# Patient Record
Sex: MALE | Race: WHITE | URBAN - METROPOLITAN AREA
[De-identification: names, ages, dates, MRNs, and addresses within clinical notes are randomized per-mention and may not be internally consistent; named-entity substitution may affect disease eponyms.]

---

## 2017-08-23 ENCOUNTER — OFFICE VISIT (OUTPATIENT)
Dept: FAMILY MEDICINE CLINIC | Age: 52
End: 2017-08-23

## 2017-08-23 VITALS
HEART RATE: 65 BPM | HEIGHT: 74 IN | DIASTOLIC BLOOD PRESSURE: 78 MMHG | WEIGHT: 223 LBS | BODY MASS INDEX: 28.62 KG/M2 | OXYGEN SATURATION: 98 % | SYSTOLIC BLOOD PRESSURE: 126 MMHG

## 2017-08-23 DIAGNOSIS — R79.89 LOW TESTOSTERONE LEVEL IN MALE: ICD-10-CM

## 2017-08-23 DIAGNOSIS — M50.322 OTHER CERVICAL DISC DEGENERATION AT C5-C6 LEVEL: ICD-10-CM

## 2017-08-23 DIAGNOSIS — R06.83 SNORING: Primary | ICD-10-CM

## 2017-08-23 DIAGNOSIS — J45.20 MILD INTERMITTENT ASTHMA WITHOUT COMPLICATION: ICD-10-CM

## 2017-08-23 LAB
HCT VFR BLD CALC: 41.9 % (ref 40.5–52.5)
HEMOGLOBIN: 13.9 G/DL (ref 13.5–17.5)
MCH RBC QN AUTO: 31 PG (ref 26–34)
MCHC RBC AUTO-ENTMCNC: 33.1 G/DL (ref 31–36)
MCV RBC AUTO: 93.7 FL (ref 80–100)
PDW BLD-RTO: 13.2 % (ref 12.4–15.4)
PLATELET # BLD: 253 K/UL (ref 135–450)
PMV BLD AUTO: 8 FL (ref 5–10.5)
PROSTATE SPECIFIC ANTIGEN: 0.87 NG/ML (ref 0–4)
RBC # BLD: 4.47 M/UL (ref 4.2–5.9)
WBC # BLD: 6.8 K/UL (ref 4–11)

## 2017-08-23 PROCEDURE — 90715 TDAP VACCINE 7 YRS/> IM: CPT | Performed by: FAMILY MEDICINE

## 2017-08-23 PROCEDURE — 90471 IMMUNIZATION ADMIN: CPT | Performed by: FAMILY MEDICINE

## 2017-08-23 PROCEDURE — 99203 OFFICE O/P NEW LOW 30 MIN: CPT | Performed by: FAMILY MEDICINE

## 2017-08-23 RX ORDER — ALBUTEROL SULFATE 90 UG/1
2 AEROSOL, METERED RESPIRATORY (INHALATION) EVERY 6 HOURS PRN
COMMUNITY
End: 2017-08-23 | Stop reason: SDUPTHER

## 2017-08-23 RX ORDER — ALBUTEROL SULFATE 90 UG/1
2 AEROSOL, METERED RESPIRATORY (INHALATION) EVERY 6 HOURS PRN
Qty: 1 INHALER | Refills: 2 | Status: SHIPPED | OUTPATIENT
Start: 2017-08-23 | End: 2018-01-25 | Stop reason: SDUPTHER

## 2017-08-23 RX ORDER — TESTOSTERONE 30 MG/1.5ML
60 SOLUTION TOPICAL DAILY
Qty: 1 BOTTLE | Refills: 2 | Status: SHIPPED | OUTPATIENT
Start: 2017-08-23 | End: 2018-01-02 | Stop reason: SDUPTHER

## 2017-08-23 ASSESSMENT — ENCOUNTER SYMPTOMS: SHORTNESS OF BREATH: 0

## 2017-08-25 LAB
SEX HORMONE BINDING GLOBULIN: 23 NMOL/L (ref 11–80)
TESTOSTERONE FREE-NONMALE: 52 PG/ML (ref 47–244)
TESTOSTERONE TOTAL: 223 NG/DL (ref 220–1000)

## 2017-09-08 ENCOUNTER — TELEPHONE (OUTPATIENT)
Dept: FAMILY MEDICINE CLINIC | Age: 52
End: 2017-09-08

## 2018-01-09 ENCOUNTER — OFFICE VISIT (OUTPATIENT)
Dept: FAMILY MEDICINE CLINIC | Age: 53
End: 2018-01-09

## 2018-01-09 VITALS
SYSTOLIC BLOOD PRESSURE: 132 MMHG | OXYGEN SATURATION: 97 % | RESPIRATION RATE: 12 BRPM | HEART RATE: 64 BPM | BODY MASS INDEX: 29.82 KG/M2 | DIASTOLIC BLOOD PRESSURE: 78 MMHG | HEIGHT: 74 IN | WEIGHT: 232.4 LBS | TEMPERATURE: 98 F

## 2018-01-09 DIAGNOSIS — R79.89 LOW TESTOSTERONE IN MALE: ICD-10-CM

## 2018-01-09 DIAGNOSIS — R06.83 SNORING: Primary | ICD-10-CM

## 2018-01-09 PROCEDURE — G8417 CALC BMI ABV UP PARAM F/U: HCPCS | Performed by: FAMILY MEDICINE

## 2018-01-09 PROCEDURE — 99214 OFFICE O/P EST MOD 30 MIN: CPT | Performed by: FAMILY MEDICINE

## 2018-01-09 PROCEDURE — G8427 DOCREV CUR MEDS BY ELIG CLIN: HCPCS | Performed by: FAMILY MEDICINE

## 2018-01-09 PROCEDURE — 3017F COLORECTAL CA SCREEN DOC REV: CPT | Performed by: FAMILY MEDICINE

## 2018-01-09 PROCEDURE — G8484 FLU IMMUNIZE NO ADMIN: HCPCS | Performed by: FAMILY MEDICINE

## 2018-01-09 PROCEDURE — 1036F TOBACCO NON-USER: CPT | Performed by: FAMILY MEDICINE

## 2018-01-09 RX ORDER — TESTOSTERONE CYPIONATE 200 MG/ML
200 INJECTION INTRAMUSCULAR
Qty: 2 VIAL | Refills: 5 | Status: SHIPPED | OUTPATIENT
Start: 2018-01-09 | End: 2018-01-09 | Stop reason: SDUPTHER

## 2018-01-09 RX ORDER — TESTOSTERONE CYPIONATE 200 MG/ML
200 INJECTION INTRAMUSCULAR
Qty: 2 VIAL | Refills: 5 | Status: SHIPPED | OUTPATIENT
Start: 2018-01-09 | End: 2018-08-10 | Stop reason: SDUPTHER

## 2018-01-09 NOTE — PROGRESS NOTES
Subjective:      Patient ID: Cain Fields is a 47 y.o. male. HPI   Pt is a of 47 y.o. male comes in today with   Chief Complaint   Patient presents with    Medication Refill     Here for med refill. Has been doing topical testosterone but not helping. Still low energy, low libido    Does snore    Review of Systems    Objective:   Physical Exam    Assessment:      1. Snoring  Amb External Referral To Sleep Studies   2. Low testosterone in male  CBC    PSA, Prostatic Specific Antigen    Comprehensive Metabolic Panel    Testosterone Free and Total Male          Plan:      1. Referred for evaluation for lolly  2. Suspect lack of absorption. Will try IM  Reviewed injection technique with the patient    recheck bloodwork 6-8 weeks after startinch  Out on technique given.   Can return for 1st injection in office if desired  20 min spent, > 50% of time counseling and coordinating care

## 2018-01-25 NOTE — TELEPHONE ENCOUNTER
Pt needs printed script for inhalers he would like a 6 month supply because he travels  [employer] would also like a maintenance inhaler because his symptoms have been bad this year  Pt was seen 1/18 but forgot to mention

## 2018-01-26 RX ORDER — ALBUTEROL SULFATE 90 UG/1
2 AEROSOL, METERED RESPIRATORY (INHALATION) EVERY 6 HOURS PRN
Qty: 3 INHALER | Refills: 2 | Status: SHIPPED | OUTPATIENT
Start: 2018-01-26 | End: 2018-02-19 | Stop reason: SDUPTHER

## 2018-02-15 LAB
ALBUMIN SERPL-MCNC: 4.6 G/DL
ALP BLD-CCNC: 61 U/L
ALT SERPL-CCNC: 32 U/L
ANION GAP SERPL CALCULATED.3IONS-SCNC: NORMAL MMOL/L
AST SERPL-CCNC: 25 U/L
BILIRUB SERPL-MCNC: 0.6 MG/DL (ref 0.1–1.4)
BUN BLDV-MCNC: 11 MG/DL
CALCIUM SERPL-MCNC: 9 MG/DL
CHLORIDE BLD-SCNC: 99 MMOL/L
CO2: 24 MMOL/L
CREAT SERPL-MCNC: 1 MG/DL
GFR CALCULATED: NORMAL
GLUCOSE BLD-MCNC: 94 MG/DL
POTASSIUM SERPL-SCNC: 4.4 MMOL/L
SODIUM BLD-SCNC: 141 MMOL/L
TOTAL PROTEIN: 7.1

## 2018-02-19 ENCOUNTER — TELEPHONE (OUTPATIENT)
Dept: FAMILY MEDICINE CLINIC | Age: 53
End: 2018-02-19

## 2018-02-19 RX ORDER — ALBUTEROL SULFATE 90 UG/1
2 AEROSOL, METERED RESPIRATORY (INHALATION) EVERY 6 HOURS PRN
Qty: 3 INHALER | Refills: 2 | Status: SHIPPED | OUTPATIENT
Start: 2018-02-19 | End: 2018-12-13 | Stop reason: SDUPTHER

## 2018-02-19 NOTE — TELEPHONE ENCOUNTER
Pt called back requesting to have a paper Rx written for his inhaler. Pt would like to purchase the medication from places where he can get it cheaper. Pt would like to stop by the office tomorrow to pick it up. Pt said he asked for it 2 weeks ago but never receive it.

## 2018-02-22 ENCOUNTER — TELEPHONE (OUTPATIENT)
Dept: FAMILY MEDICINE CLINIC | Age: 53
End: 2018-02-22

## 2018-03-02 NOTE — TELEPHONE ENCOUNTER
Spoke with patient about the aveo inhaler. He said that he has been on this inhaler for a very long time, that he is a 47year old man, he doesn't need to be seen for an appointment to be checked for asthma when he has already been diagnosed with it when he was younger. Talked to Dr. Edelmira Agarwal, he said that as long as the patient tells us exactly which inhaler he is on. Left message for patient to call back with his inhaler information in order for us to fill this prescription.

## 2018-03-07 ENCOUNTER — TELEPHONE (OUTPATIENT)
Dept: FAMILY MEDICINE CLINIC | Age: 53
End: 2018-03-07

## 2018-03-07 RX ORDER — FLUTICASONE FUROATE AND VILANTEROL 100; 25 UG/1; UG/1
1 POWDER RESPIRATORY (INHALATION) DAILY
Qty: 1 EACH | Refills: 5 | Status: SHIPPED | OUTPATIENT
Start: 2018-03-07 | End: 2018-03-08 | Stop reason: SDUPTHER

## 2018-03-07 NOTE — TELEPHONE ENCOUNTER
Patient stopped by the office with his inhaler, he takes Breo Ellipta 100/25 MCG, 1 puff daily.  Pending to [facility]

## 2018-03-08 RX ORDER — FLUTICASONE FUROATE AND VILANTEROL 100; 25 UG/1; UG/1
1 POWDER RESPIRATORY (INHALATION) DAILY
Qty: 1 EACH | Refills: 5 | Status: SHIPPED | OUTPATIENT
Start: 2018-03-08 | End: 2018-09-07 | Stop reason: SDUPTHER

## 2018-08-06 DIAGNOSIS — R79.89 LOW TESTOSTERONE: Primary | ICD-10-CM

## 2018-08-06 NOTE — TELEPHONE ENCOUNTER
Medication:   Requested Prescriptions     Pending Prescriptions Disp Refills    testosterone cypionate (DEPOTESTOTERONE CYPIONATE) 200 MG/ML injection [Pharmacy Med Name: TESTOST CYP 200MG/ML INJ]  5     Sig: INJECT ONE ML (CC) INTRAMUSCULARLY EVERY 14 DAYS        Last Filled:  1/9/2018 2 vials w/ 5 refills     Patient Phone Number: [phone] (home)      Last appt: 01.09.2018  Return in about 6 months (around 7/9/2018). Next appt: Visit date not found    Last OARRS: No flowsheet data found.     Preferred Pharmacy:   [address] [place] [address]  [address] [address]  Phone: [phone] Fax: [phone]

## 2018-08-07 RX ORDER — TESTOSTERONE CYPIONATE 200 MG/ML
INJECTION INTRAMUSCULAR
Qty: 2 VIAL | Refills: 0 | Status: SHIPPED | OUTPATIENT
Start: 2018-08-07 | End: 2018-08-10

## 2018-08-10 ENCOUNTER — OFFICE VISIT (OUTPATIENT)
Dept: FAMILY MEDICINE CLINIC | Age: 53
End: 2018-08-10

## 2018-08-10 VITALS
SYSTOLIC BLOOD PRESSURE: 110 MMHG | HEART RATE: 70 BPM | WEIGHT: 225 LBS | BODY MASS INDEX: 31.5 KG/M2 | OXYGEN SATURATION: 96 % | HEIGHT: 71 IN | DIASTOLIC BLOOD PRESSURE: 72 MMHG

## 2018-08-10 DIAGNOSIS — R79.89 LOW TESTOSTERONE IN MALE: ICD-10-CM

## 2018-08-10 DIAGNOSIS — Z00.00 HEALTHCARE MAINTENANCE: ICD-10-CM

## 2018-08-10 DIAGNOSIS — B00.9 HERPES SIMPLEX: ICD-10-CM

## 2018-08-10 DIAGNOSIS — R79.89 LOW TESTOSTERONE IN MALE: Primary | ICD-10-CM

## 2018-08-10 LAB
A/G RATIO: 1.9 (ref 1.1–2.2)
ALBUMIN SERPL-MCNC: 4.7 G/DL (ref 3.4–5)
ALP BLD-CCNC: 62 U/L (ref 40–129)
ALT SERPL-CCNC: 25 U/L (ref 10–40)
ANION GAP SERPL CALCULATED.3IONS-SCNC: 13 MMOL/L (ref 3–16)
AST SERPL-CCNC: 19 U/L (ref 15–37)
BASOPHILS ABSOLUTE: 0 K/UL (ref 0–0.2)
BASOPHILS RELATIVE PERCENT: 0.4 %
BILIRUB SERPL-MCNC: <0.2 MG/DL (ref 0–1)
BUN BLDV-MCNC: 11 MG/DL (ref 7–20)
CALCIUM SERPL-MCNC: 9 MG/DL (ref 8.3–10.6)
CHLORIDE BLD-SCNC: 104 MMOL/L (ref 99–110)
CHOLESTEROL, TOTAL: 163 MG/DL (ref 0–199)
CO2: 25 MMOL/L (ref 21–32)
CREAT SERPL-MCNC: 0.9 MG/DL (ref 0.9–1.3)
EOSINOPHILS ABSOLUTE: 0.2 K/UL (ref 0–0.6)
EOSINOPHILS RELATIVE PERCENT: 2.4 %
GFR AFRICAN AMERICAN: >60
GFR NON-AFRICAN AMERICAN: >60
GLOBULIN: 2.5 G/DL
GLUCOSE BLD-MCNC: 120 MG/DL (ref 70–99)
HCT VFR BLD CALC: 45.3 % (ref 40.5–52.5)
HDLC SERPL-MCNC: 40 MG/DL (ref 40–60)
HEMOGLOBIN: 15.4 G/DL (ref 13.5–17.5)
LDL CHOLESTEROL CALCULATED: 69 MG/DL
LYMPHOCYTES ABSOLUTE: 2 K/UL (ref 1–5.1)
LYMPHOCYTES RELATIVE PERCENT: 25.2 %
MCH RBC QN AUTO: 31.5 PG (ref 26–34)
MCHC RBC AUTO-ENTMCNC: 34 G/DL (ref 31–36)
MCV RBC AUTO: 92.6 FL (ref 80–100)
MONOCYTES ABSOLUTE: 0.7 K/UL (ref 0–1.3)
MONOCYTES RELATIVE PERCENT: 8.5 %
NEUTROPHILS ABSOLUTE: 5 K/UL (ref 1.7–7.7)
NEUTROPHILS RELATIVE PERCENT: 63.5 %
PDW BLD-RTO: 14.2 % (ref 12.4–15.4)
PLATELET # BLD: 283 K/UL (ref 135–450)
PMV BLD AUTO: 8.1 FL (ref 5–10.5)
POTASSIUM SERPL-SCNC: 4.6 MMOL/L (ref 3.5–5.1)
PROSTATE SPECIFIC ANTIGEN: 0.82 NG/ML (ref 0–4)
RBC # BLD: 4.89 M/UL (ref 4.2–5.9)
SODIUM BLD-SCNC: 142 MMOL/L (ref 136–145)
TOTAL PROTEIN: 7.2 G/DL (ref 6.4–8.2)
TRIGL SERPL-MCNC: 269 MG/DL (ref 0–150)
VLDLC SERPL CALC-MCNC: 54 MG/DL
WBC # BLD: 7.8 K/UL (ref 4–11)

## 2018-08-10 PROCEDURE — G8417 CALC BMI ABV UP PARAM F/U: HCPCS | Performed by: NURSE PRACTITIONER

## 2018-08-10 PROCEDURE — 3017F COLORECTAL CA SCREEN DOC REV: CPT | Performed by: NURSE PRACTITIONER

## 2018-08-10 PROCEDURE — 99213 OFFICE O/P EST LOW 20 MIN: CPT | Performed by: NURSE PRACTITIONER

## 2018-08-10 PROCEDURE — 1036F TOBACCO NON-USER: CPT | Performed by: NURSE PRACTITIONER

## 2018-08-10 PROCEDURE — G8427 DOCREV CUR MEDS BY ELIG CLIN: HCPCS | Performed by: NURSE PRACTITIONER

## 2018-08-10 RX ORDER — TESTOSTERONE CYPIONATE 200 MG/ML
200 INJECTION INTRAMUSCULAR
Qty: 2 VIAL | Refills: 5 | Status: SHIPPED | OUTPATIENT
Start: 2018-08-10 | End: 2019-04-15 | Stop reason: SDUPTHER

## 2018-08-10 RX ORDER — VALACYCLOVIR HYDROCHLORIDE 1 G/1
1000 TABLET, FILM COATED ORAL 2 TIMES DAILY PRN
Qty: 30 TABLET | Refills: 0 | Status: SHIPPED | OUTPATIENT
Start: 2018-08-10 | End: 2018-10-16 | Stop reason: SDUPTHER

## 2018-08-10 ASSESSMENT — ENCOUNTER SYMPTOMS
CONSTIPATION: 0
SINUS PRESSURE: 0
BLOOD IN STOOL: 0
CHEST TIGHTNESS: 0
ABDOMINAL PAIN: 0
SHORTNESS OF BREATH: 0
EYE REDNESS: 0
VOMITING: 0
RHINORRHEA: 0
BACK PAIN: 0
DIARRHEA: 0
APNEA: 0
ABDOMINAL DISTENTION: 0
COUGH: 0
EYE PAIN: 0
WHEEZING: 0
NAUSEA: 0

## 2018-08-10 NOTE — PROGRESS NOTES
Genitourinary: Negative for difficulty urinating, dysuria, enuresis, frequency, hematuria and urgency. Musculoskeletal: Negative for back pain, gait problem, joint swelling and neck pain. Skin: Negative for rash and wound. Allergic/Immunologic: Negative for environmental allergies, food allergies and immunocompromised state. Neurological: Negative for dizziness, syncope, light-headedness, numbness and headaches. Hematological: Negative for adenopathy. Does not bruise/bleed easily. Psychiatric/Behavioral: Negative for agitation, behavioral problems and confusion. The patient is not nervous/anxious. Physical Exam   Constitutional: He is oriented to person, place, and time. He appears well-developed and well-nourished. No distress. HENT:   Head: Normocephalic and atraumatic. Eyes: Conjunctivae are normal. No scleral icterus. Neck: Normal range of motion. Neck supple. No tracheal deviation present. Cardiovascular: Normal rate, regular rhythm, normal heart sounds and intact distal pulses. Exam reveals no gallop and no friction rub. No murmur heard. Pulmonary/Chest: Effort normal and breath sounds normal. No respiratory distress. He has no wheezes. He has no rales. He exhibits no tenderness. Abdominal: Soft. Bowel sounds are normal. He exhibits no distension. There is no tenderness. Musculoskeletal: Normal range of motion. He exhibits no edema. Neurological: He is alert and oriented to person, place, and time. Coordination normal.   Skin: Skin is warm and dry. No rash noted. He is not diaphoretic. No erythema. No pallor. Psychiatric: He has a normal mood and affect. Thought content normal.     Assessment/Plan:  1. Low testosterone in male  Stable, controlled on current regimen. Due for labs. - Comprehensive Metabolic Panel; Future  - Testosterone, free, total; Future  - Testosterone; Future  - CBC Auto Differential; Future  - Lipid Panel; Future  - Psa screening;  Future  -

## 2018-08-14 LAB
SEX HORMONE BINDING GLOBULIN: 27 NMOL/L (ref 11–80)
TESTOSTERONE FREE-NONMALE: 21.4 PG/ML (ref 47–244)
TESTOSTERONE TOTAL: 104 NG/DL (ref 220–1000)

## 2018-09-07 RX ORDER — FLUTICASONE FUROATE AND VILANTEROL TRIFENATATE 100; 25 UG/1; UG/1
POWDER RESPIRATORY (INHALATION)
Qty: 60 EACH | Refills: 5 | Status: SHIPPED | OUTPATIENT
Start: 2018-09-07 | End: 2018-12-13 | Stop reason: SDUPTHER

## 2018-09-07 NOTE — TELEPHONE ENCOUNTER
Medication:   Requested Prescriptions     Pending Prescriptions Disp Refills    BREO ELLIPTA 100-25 MCG/INH AEPB inhaler [Pharmacy Med Name: Марина Sin 100-25  INH]  5     Sig: INHALE 1 PUFF BY MOUTH ONCE DAILY       Last Filled:      Patient Phone Number: [phone] (home)     Last appt: 8/10/2018   Next appt: Visit date not found    Last Labs DM: No results found for: LABA1C  Last Lipid:   Lab Results   Component Value Date    CHOL 163 08/10/2018    TRIG 269 08/10/2018    HDL 40 08/10/2018    LDLCALC 69 08/10/2018     Last PSA:   Lab Results   Component Value Date    PSA 0.82 08/10/2018     Last Thyroid: No results found for: TSH, FT3, I2HCOZM, T4FREE, T8THGDI    Last OARRS:   RX Monitoring 8/10/2018   Attestation The Prescription Monitoring Report for this patient was reviewed today. Documentation Possible medication side effects, risk of tolerance/dependence & alternative treatments discussed. ;No signs of potential drug abuse or diversion identified. Preferred Pharmacy:   [facility] [address] [place] [address]  [address] [facility] [address]  [address]  Phone: [phone] Fax: [phone]    [address] [facility] [phone] [facility] [phone]  [address]   [place] [address]  Phone: [phone] Fax: [phone]

## 2018-10-16 DIAGNOSIS — B00.9 HERPES SIMPLEX: ICD-10-CM

## 2018-10-17 RX ORDER — VALACYCLOVIR HYDROCHLORIDE 1 G/1
1000 TABLET, FILM COATED ORAL 2 TIMES DAILY PRN
Qty: 30 TABLET | Refills: 0 | Status: SHIPPED | OUTPATIENT
Start: 2018-10-17 | End: 2019-04-22 | Stop reason: SDUPTHER

## 2018-11-14 ENCOUNTER — OFFICE VISIT (OUTPATIENT)
Dept: FAMILY MEDICINE CLINIC | Age: 53
End: 2018-11-14
Payer: COMMERCIAL

## 2018-11-14 VITALS
BODY MASS INDEX: 31.5 KG/M2 | HEIGHT: 71 IN | WEIGHT: 225 LBS | SYSTOLIC BLOOD PRESSURE: 128 MMHG | OXYGEN SATURATION: 98 % | HEART RATE: 59 BPM | DIASTOLIC BLOOD PRESSURE: 80 MMHG

## 2018-11-14 DIAGNOSIS — G89.29 CHRONIC LEFT-SIDED LOW BACK PAIN WITHOUT SCIATICA: Primary | ICD-10-CM

## 2018-11-14 DIAGNOSIS — M54.50 CHRONIC LEFT-SIDED LOW BACK PAIN WITHOUT SCIATICA: Primary | ICD-10-CM

## 2018-11-14 PROCEDURE — 99213 OFFICE O/P EST LOW 20 MIN: CPT | Performed by: FAMILY MEDICINE

## 2018-11-14 PROCEDURE — G8417 CALC BMI ABV UP PARAM F/U: HCPCS | Performed by: FAMILY MEDICINE

## 2018-11-14 PROCEDURE — G8427 DOCREV CUR MEDS BY ELIG CLIN: HCPCS | Performed by: FAMILY MEDICINE

## 2018-11-14 PROCEDURE — 3017F COLORECTAL CA SCREEN DOC REV: CPT | Performed by: FAMILY MEDICINE

## 2018-11-14 PROCEDURE — G8484 FLU IMMUNIZE NO ADMIN: HCPCS | Performed by: FAMILY MEDICINE

## 2018-11-14 PROCEDURE — 1036F TOBACCO NON-USER: CPT | Performed by: FAMILY MEDICINE

## 2018-11-14 ASSESSMENT — PATIENT HEALTH QUESTIONNAIRE - PHQ9
2. FEELING DOWN, DEPRESSED OR HOPELESS: 0
SUM OF ALL RESPONSES TO PHQ QUESTIONS 1-9: 0
SUM OF ALL RESPONSES TO PHQ9 QUESTIONS 1 & 2: 0
SUM OF ALL RESPONSES TO PHQ QUESTIONS 1-9: 0
1. LITTLE INTEREST OR PLEASURE IN DOING THINGS: 0

## 2018-11-29 ENCOUNTER — OFFICE VISIT (OUTPATIENT)
Dept: ORTHOPEDIC SURGERY | Age: 53
End: 2018-11-29
Payer: COMMERCIAL

## 2018-11-29 VITALS — HEIGHT: 71 IN | WEIGHT: 225.09 LBS | BODY MASS INDEX: 31.51 KG/M2

## 2018-11-29 DIAGNOSIS — M43.16 SPONDYLOLISTHESIS OF LUMBAR REGION: Primary | ICD-10-CM

## 2018-11-29 DIAGNOSIS — M54.16 LUMBAR RADICULOPATHY: ICD-10-CM

## 2018-11-29 DIAGNOSIS — M51.36 DDD (DEGENERATIVE DISC DISEASE), LUMBAR: ICD-10-CM

## 2018-11-29 PROCEDURE — G8417 CALC BMI ABV UP PARAM F/U: HCPCS | Performed by: PHYSICAL MEDICINE & REHABILITATION

## 2018-11-29 PROCEDURE — G8427 DOCREV CUR MEDS BY ELIG CLIN: HCPCS | Performed by: PHYSICAL MEDICINE & REHABILITATION

## 2018-11-29 PROCEDURE — 99243 OFF/OP CNSLTJ NEW/EST LOW 30: CPT | Performed by: PHYSICAL MEDICINE & REHABILITATION

## 2018-11-29 PROCEDURE — G8484 FLU IMMUNIZE NO ADMIN: HCPCS | Performed by: PHYSICAL MEDICINE & REHABILITATION

## 2018-11-29 PROCEDURE — 3017F COLORECTAL CA SCREEN DOC REV: CPT | Performed by: PHYSICAL MEDICINE & REHABILITATION

## 2018-11-30 ENCOUNTER — TELEPHONE (OUTPATIENT)
Dept: ORTHOPEDIC SURGERY | Age: 53
End: 2018-11-30

## 2018-11-30 NOTE — TELEPHONE ENCOUNTER
Spoke to patient regarding MRI [facility] approval.  Authorization is valid until 5/28/19. Patient will need to call DAY[facility] Esteban to schedule MRI. He is scheduled for follow up appointment with Dr. Jennie Oconnor on 12/20/18. He noted understanding.

## 2018-12-11 ENCOUNTER — OFFICE VISIT (OUTPATIENT)
Dept: ORTHOPEDIC SURGERY | Age: 53
End: 2018-12-11
Payer: COMMERCIAL

## 2018-12-11 VITALS
HEIGHT: 73 IN | SYSTOLIC BLOOD PRESSURE: 134 MMHG | BODY MASS INDEX: 29.16 KG/M2 | WEIGHT: 220 LBS | DIASTOLIC BLOOD PRESSURE: 75 MMHG

## 2018-12-11 DIAGNOSIS — M51.26 LUMBAR DISC HERNIATION: Primary | ICD-10-CM

## 2018-12-11 DIAGNOSIS — M54.16 LUMBAR RADICULOPATHY: ICD-10-CM

## 2018-12-11 PROCEDURE — 1036F TOBACCO NON-USER: CPT | Performed by: PHYSICAL MEDICINE & REHABILITATION

## 2018-12-11 PROCEDURE — 3017F COLORECTAL CA SCREEN DOC REV: CPT | Performed by: PHYSICAL MEDICINE & REHABILITATION

## 2018-12-11 PROCEDURE — G8427 DOCREV CUR MEDS BY ELIG CLIN: HCPCS | Performed by: PHYSICAL MEDICINE & REHABILITATION

## 2018-12-11 PROCEDURE — G8484 FLU IMMUNIZE NO ADMIN: HCPCS | Performed by: PHYSICAL MEDICINE & REHABILITATION

## 2018-12-11 PROCEDURE — 99213 OFFICE O/P EST LOW 20 MIN: CPT | Performed by: PHYSICAL MEDICINE & REHABILITATION

## 2018-12-11 PROCEDURE — G8417 CALC BMI ABV UP PARAM F/U: HCPCS | Performed by: PHYSICAL MEDICINE & REHABILITATION

## 2018-12-11 NOTE — PROGRESS NOTES
8.3 - 10.6 mg/dL    Total Protein 7.2 6.4 - 8.2 g/dL    Alb 4.7 3.4 - 5.0 g/dL    Albumin/Globulin Ratio 1.9 1.1 - 2.2    Total Bilirubin <0.2 0.0 - 1.0 mg/dL    Alkaline Phosphatase 62 40 - 129 U/L    ALT 25 10 - 40 U/L    AST 19 15 - 37 U/L    Globulin 2.5 g/dL   Testosterone, free, total   Result Value Ref Range    Testosterone 104 (L) 220 - 1,000 ng/dL    Sex Hormone Binding 27 11 - 80 nmol/L    Testosterone, Free 21.4 (L) 47 - 244 pg/mL   CBC Auto Differential   Result Value Ref Range    WBC 7.8 4.0 - 11.0 K/uL    RBC 4.89 4.20 - 5.90 M/uL    Hemoglobin 15.4 13.5 - 17.5 g/dL    Hematocrit 45.3 40.5 - 52.5 %    MCV 92.6 80.0 - 100.0 fL    MCH 31.5 26.0 - 34.0 pg    MCHC 34.0 31.0 - 36.0 g/dL    RDW 14.2 12.4 - 15.4 %    Platelets 640 081 - 695 K/uL    MPV 8.1 5.0 - 10.5 fL    Neutrophils % 63.5 %    Lymphocytes % 25.2 %    Monocytes % 8.5 %    Eosinophils % 2.4 %    Basophils % 0.4 %    Neutrophils # 5.0 1.7 - 7.7 K/uL    Lymphocytes # 2.0 1.0 - 5.1 K/uL    Monocytes # 0.7 0.0 - 1.3 K/uL    Eosinophils # 0.2 0.0 - 0.6 K/uL    Basophils # 0.0 0.0 - 0.2 K/uL   Lipid Panel   Result Value Ref Range    Cholesterol, Total 163 0 - 199 mg/dL    Triglycerides 269 (H) 0 - 150 mg/dL    HDL 40 40 - 60 mg/dL    LDL Calculated 69 <100 mg/dL    VLDL Cholesterol Calculated 54 Not Established mg/dL   Psa screening   Result Value Ref Range    PSA 0.82 0.00 - 4.00 ng/mL     Impression:       1. Lumbar disc herniation    2. Lumbar radiculopathy        Plan:  Clinical Course: Above diagnoses are improving     I discussed the diagnosis and the treatment options with Kajal Gomez today. In Summary:  The various treatment options were outlined and discussed with Kajal Reasons including:  Conservative care options: physical therapy, ice, medications, bracing, and activity modification. The indications for therapeutic injections. The indications for additional imaging/laboratory studies.   The indications for (possible future)

## 2018-12-13 ENCOUNTER — OFFICE VISIT (OUTPATIENT)
Dept: FAMILY MEDICINE CLINIC | Age: 53
End: 2018-12-13
Payer: COMMERCIAL

## 2018-12-13 VITALS
WEIGHT: 230.2 LBS | RESPIRATION RATE: 10 BRPM | OXYGEN SATURATION: 98 % | DIASTOLIC BLOOD PRESSURE: 82 MMHG | BODY MASS INDEX: 30.51 KG/M2 | HEIGHT: 73 IN | HEART RATE: 78 BPM | SYSTOLIC BLOOD PRESSURE: 136 MMHG

## 2018-12-13 DIAGNOSIS — J45.20 MILD INTERMITTENT ASTHMA WITHOUT COMPLICATION: ICD-10-CM

## 2018-12-13 DIAGNOSIS — J06.9 UPPER RESPIRATORY TRACT INFECTION, UNSPECIFIED TYPE: Primary | ICD-10-CM

## 2018-12-13 DIAGNOSIS — Z12.11 COLON CANCER SCREENING: ICD-10-CM

## 2018-12-13 PROCEDURE — 99213 OFFICE O/P EST LOW 20 MIN: CPT | Performed by: NURSE PRACTITIONER

## 2018-12-13 PROCEDURE — G8417 CALC BMI ABV UP PARAM F/U: HCPCS | Performed by: NURSE PRACTITIONER

## 2018-12-13 PROCEDURE — G8484 FLU IMMUNIZE NO ADMIN: HCPCS | Performed by: NURSE PRACTITIONER

## 2018-12-13 PROCEDURE — 3017F COLORECTAL CA SCREEN DOC REV: CPT | Performed by: NURSE PRACTITIONER

## 2018-12-13 PROCEDURE — 1036F TOBACCO NON-USER: CPT | Performed by: NURSE PRACTITIONER

## 2018-12-13 PROCEDURE — G8427 DOCREV CUR MEDS BY ELIG CLIN: HCPCS | Performed by: NURSE PRACTITIONER

## 2018-12-13 RX ORDER — ALBUTEROL SULFATE 90 UG/1
2 AEROSOL, METERED RESPIRATORY (INHALATION) EVERY 6 HOURS PRN
Qty: 3 INHALER | Refills: 2 | Status: SHIPPED | OUTPATIENT
Start: 2018-12-13

## 2018-12-13 RX ORDER — ALBUTEROL SULFATE 1.25 MG/3ML
1 SOLUTION RESPIRATORY (INHALATION) EVERY 6 HOURS PRN
COMMUNITY
End: 2018-12-13 | Stop reason: SDUPTHER

## 2018-12-13 RX ORDER — FLUTICASONE FUROATE AND VILANTEROL 100; 25 UG/1; UG/1
POWDER RESPIRATORY (INHALATION)
Qty: 60 EACH | Refills: 5 | Status: SHIPPED | OUTPATIENT
Start: 2018-12-13

## 2018-12-13 RX ORDER — ALBUTEROL SULFATE 1.25 MG/3ML
1 SOLUTION RESPIRATORY (INHALATION) EVERY 6 HOURS PRN
Qty: 360 ML | Refills: 2 | Status: SHIPPED | OUTPATIENT
Start: 2018-12-13 | End: 2018-12-18 | Stop reason: SDUPTHER

## 2018-12-13 RX ORDER — PREDNISONE 10 MG/1
50 TABLET ORAL DAILY
Qty: 25 TABLET | Refills: 0 | Status: SHIPPED | OUTPATIENT
Start: 2018-12-13 | End: 2018-12-14 | Stop reason: SDUPTHER

## 2018-12-13 RX ORDER — AZITHROMYCIN 500 MG/1
500 TABLET, FILM COATED ORAL DAILY
Qty: 1 PACKET | Refills: 0 | Status: SHIPPED | OUTPATIENT
Start: 2018-12-13 | End: 2018-12-14 | Stop reason: SDUPTHER

## 2018-12-13 ASSESSMENT — ENCOUNTER SYMPTOMS
SWOLLEN GLANDS: 0
RHINORRHEA: 1
SINUS PAIN: 1
COUGH: 1
WHEEZING: 1
SORE THROAT: 1

## 2018-12-13 NOTE — PROGRESS NOTES
(DELTASONE) 10 MG tablet; Take 5 tablets by mouth daily for 5 days  Dispense: 25 tablet; Refill: 0  Sudafed before flying   Use albuterol every 6 hours while awake and ill   Supportive care reviewed  Humidified air  Honey lemon tea  Nasal rinse prn  Flonase daily  NSAIDs/ tylenol for pain and fever  Mucinex prn for congestion     2. Colon cancer screening  - COLONOSCOPY W/ OR W/O BIOPSY    3. Mild intermittent asthma without complication  Stable, controlled when not ill. Refilling inhalers. - albuterol sulfate HFA (VENTOLIN HFA) 108 (90 Base) MCG/ACT inhaler; Inhale 2 puffs into the lungs every 6 hours as needed for Wheezing  Dispense: 3 Inhaler; Refill: 2  - fluticasone-vilanterol (BREO ELLIPTA) 100-25 MCG/INH AEPB inhaler; INHALE 1 PUFF BY MOUTH ONCE DAILY  Dispense: 60 each; Refill: 5  - albuterol (ACCUNEB) 1.25 MG/3ML nebulizer solution;  Inhale 3 mLs into the lungs every 6 hours as needed for Wheezing  Dispense: 360 mL; Refill: 2    Follow up for new / worsening symptoms or failing to improve as anticipated    Electronically signed by EDWARD Biggs CNP on 12/13/2018 at 11:21 AM

## 2018-12-14 ENCOUNTER — TELEPHONE (OUTPATIENT)
Dept: FAMILY MEDICINE CLINIC | Age: 53
End: 2018-12-14

## 2018-12-14 DIAGNOSIS — J06.9 UPPER RESPIRATORY TRACT INFECTION, UNSPECIFIED TYPE: ICD-10-CM

## 2018-12-14 RX ORDER — PREDNISONE 10 MG/1
50 TABLET ORAL DAILY
Qty: 25 TABLET | Refills: 0 | Status: SHIPPED | OUTPATIENT
Start: 2018-12-14 | End: 2018-12-19

## 2018-12-14 RX ORDER — AZITHROMYCIN 500 MG/1
500 TABLET, FILM COATED ORAL DAILY
Qty: 1 PACKET | Refills: 0 | Status: SHIPPED | OUTPATIENT
Start: 2018-12-14 | End: 2018-12-17

## 2018-12-18 DIAGNOSIS — J45.20 MILD INTERMITTENT ASTHMA WITHOUT COMPLICATION: ICD-10-CM

## 2018-12-18 RX ORDER — ALBUTEROL SULFATE 1.25 MG/3ML
1 SOLUTION RESPIRATORY (INHALATION) EVERY 6 HOURS PRN
Qty: 360 ML | Refills: 2 | Status: SHIPPED | OUTPATIENT
Start: 2018-12-18

## 2019-01-07 NOTE — PROGRESS NOTES
Results for the following test have been finalized and entered into the patients chart.
the spine conditions and to review interventional treatments the patient was directed to [facility].    6.  Follow up:  1-2 weeks    Gisell Garcia was instructed to call the office if his symptoms worsen or if new symptoms appear prior to the next scheduled visit. He is specifically instructed to contact the office between now & his scheduled appointment if he has concerns related to his condition or if he needs assistance in scheduling the above tests. He is welcome to call for an appointment sooner if he has any additional concerns or questions. Tremaine Fischer ATC, am scribing for and in the presence of Dr. Kiesha Middleton. 11/29/18 12:13 PM Destinee Dominguez ATC    I, Dr. Jaren Hoyos. Mega, personally performed the services described in this documentation as scribed by MAMIE Chen in my presence and it is both accurate and complete. Melvin Maurer. Abhi Nugent MD, DOLORES, [facility]  Board Certified in [address] [place] [address]  Certified and Fellowship Trained in [facility] ([facility])     This dictation was performed with a verbal recognition program [facility]) and it was checked for errors. It is possible that there are still dictated errors within this office note. If so, please bring any errors to my attention for an addendum. All efforts were made to ensure that this office note is accurate.

## 2019-04-15 DIAGNOSIS — R79.89 LOW TESTOSTERONE IN MALE: ICD-10-CM

## 2019-04-15 RX ORDER — TESTOSTERONE CYPIONATE 200 MG/ML
INJECTION INTRAMUSCULAR
Qty: 2 ML | Refills: 0 | Status: SHIPPED | OUTPATIENT
Start: 2019-04-15 | End: 2019-05-10 | Stop reason: SDUPTHER

## 2019-04-15 NOTE — TELEPHONE ENCOUNTER
Last OV 12/13/2018 (acute); 8/10/2018 (testosterone)  Next OV Visit date not found  Last Fill 8/10/2018    Last OARRS   RX Monitoring 8/10/2018   Attestation The Prescription Monitoring Report for this patient was reviewed today. Chronic Pain Routine Monitoring Possible medication side effects, risk of tolerance/dependence & alternative treatments discussed. ;No signs of potential drug abuse or diversion identified.

## 2019-04-22 DIAGNOSIS — B00.9 HERPES SIMPLEX: ICD-10-CM

## 2019-04-23 RX ORDER — VALACYCLOVIR HYDROCHLORIDE 1 G/1
TABLET, FILM COATED ORAL
Qty: 30 TABLET | Refills: 0 | Status: SHIPPED | OUTPATIENT
Start: 2019-04-23

## 2019-05-10 ENCOUNTER — OFFICE VISIT (OUTPATIENT)
Dept: FAMILY MEDICINE CLINIC | Age: 54
End: 2019-05-10
Payer: COMMERCIAL

## 2019-05-10 VITALS
RESPIRATION RATE: 12 BRPM | DIASTOLIC BLOOD PRESSURE: 80 MMHG | BODY MASS INDEX: 29.72 KG/M2 | SYSTOLIC BLOOD PRESSURE: 122 MMHG | WEIGHT: 224.2 LBS | OXYGEN SATURATION: 98 % | HEART RATE: 69 BPM | HEIGHT: 73 IN

## 2019-05-10 DIAGNOSIS — R79.89 LOW TESTOSTERONE IN MALE: Primary | ICD-10-CM

## 2019-05-10 DIAGNOSIS — Z99.89 OSA ON CPAP: ICD-10-CM

## 2019-05-10 DIAGNOSIS — G47.33 OSA ON CPAP: ICD-10-CM

## 2019-05-10 PROCEDURE — G8427 DOCREV CUR MEDS BY ELIG CLIN: HCPCS | Performed by: FAMILY MEDICINE

## 2019-05-10 PROCEDURE — 99213 OFFICE O/P EST LOW 20 MIN: CPT | Performed by: FAMILY MEDICINE

## 2019-05-10 PROCEDURE — 1036F TOBACCO NON-USER: CPT | Performed by: FAMILY MEDICINE

## 2019-05-10 PROCEDURE — 3017F COLORECTAL CA SCREEN DOC REV: CPT | Performed by: FAMILY MEDICINE

## 2019-05-10 PROCEDURE — G8417 CALC BMI ABV UP PARAM F/U: HCPCS | Performed by: FAMILY MEDICINE

## 2019-05-10 RX ORDER — MONTELUKAST SODIUM 10 MG/1
TABLET ORAL
Refills: 2 | COMMUNITY
Start: 2019-04-23 | End: 2019-06-07 | Stop reason: SDUPTHER

## 2019-05-10 RX ORDER — TESTOSTERONE CYPIONATE 200 MG/ML
200 INJECTION INTRAMUSCULAR
Qty: 2 ML | Refills: 5 | Status: SHIPPED | OUTPATIENT
Start: 2019-05-10 | End: 2019-11-06

## 2019-05-10 ASSESSMENT — PATIENT HEALTH QUESTIONNAIRE - PHQ9
SUM OF ALL RESPONSES TO PHQ9 QUESTIONS 1 & 2: 0
1. LITTLE INTEREST OR PLEASURE IN DOING THINGS: 0
SUM OF ALL RESPONSES TO PHQ QUESTIONS 1-9: 0
SUM OF ALL RESPONSES TO PHQ QUESTIONS 1-9: 0
2. FEELING DOWN, DEPRESSED OR HOPELESS: 0

## 2019-05-10 NOTE — PROGRESS NOTES
Subjective:      Patient ID: Chani Rg is a 47 y.o. male. HPI   Pt is a of 47 y.o. male comes in today with   Chief Complaint   Patient presents with    Other     testosterone refill      Here for testo IM  1 shot every 2 weeks. Energy okay. Libido didn't make a big increase but feels better overall. Treated for cpap now. Past Medical History:Reviewed  Medications:Reviewed. No Known Allergies   Social hx:Reviewed. Social History     Tobacco Use   Smoking Status Never Smoker   Smokeless Tobacco Never Used     Review of Systems   Constitutional: Negative. Vitals:    05/10/19 1334   BP: 122/80   Site: Left Upper Arm   Position: Sitting   Cuff Size: Large Adult   Pulse: 69   Resp: 12   SpO2: 98%   Weight: 224 lb 3.2 oz (101.7 kg)   Height: 6' 1\" (1.854 m)      Objective:   Physical Exam    Assessment:       Diagnosis Orders   1. Low testosterone in male  CBC    Testosterone, free, total    Psa screening    testosterone cypionate (DEPOTESTOTERONE CYPIONATE) 200 MG/ML injection   2. WILL on CPAP            Plan:      Due for colonoscopy next year. symptoms improved on t replacement.  Recheck ordered    will improved on cpap    Jefe Whitt MD

## 2019-05-13 PROBLEM — Z99.89 OSA ON CPAP: Status: ACTIVE | Noted: 2019-05-13

## 2019-05-13 PROBLEM — G47.33 OSA ON CPAP: Status: ACTIVE | Noted: 2019-05-13

## 2019-06-06 DIAGNOSIS — R79.89 LOW TESTOSTERONE IN MALE: ICD-10-CM

## 2019-06-06 LAB
HCT VFR BLD CALC: 41.9 % (ref 40.5–52.5)
HEMOGLOBIN: 14.2 G/DL (ref 13.5–17.5)
MCH RBC QN AUTO: 31.6 PG (ref 26–34)
MCHC RBC AUTO-ENTMCNC: 33.9 G/DL (ref 31–36)
MCV RBC AUTO: 93.1 FL (ref 80–100)
PDW BLD-RTO: 14.4 % (ref 12.4–15.4)
PLATELET # BLD: 235 K/UL (ref 135–450)
PMV BLD AUTO: 8.7 FL (ref 5–10.5)
PROSTATE SPECIFIC ANTIGEN: 0.75 NG/ML (ref 0–4)
RBC # BLD: 4.5 M/UL (ref 4.2–5.9)
WBC # BLD: 5.8 K/UL (ref 4–11)

## 2019-06-07 RX ORDER — MONTELUKAST SODIUM 10 MG/1
10 TABLET ORAL NIGHTLY
Qty: 30 TABLET | Refills: 2 | Status: SHIPPED | OUTPATIENT
Start: 2019-06-07

## 2019-06-07 NOTE — TELEPHONE ENCOUNTER
Montelukast    Breo  Testosterone  Syringe needle 18GX1\" 3MLrefills needed. Send to [facility] [address]. Pt is completely out. Pt thought all of his Rx's had been called in to his pharmacy at the time of his appt. Please call in today if possible.   Especially the Montelukast.

## 2019-06-08 LAB
SEX HORMONE BINDING GLOBULIN: 20 NMOL/L (ref 11–80)
TESTOSTERONE FREE-NONMALE: 230.4 PG/ML (ref 47–244)
TESTOSTERONE TOTAL: 796 NG/DL (ref 220–1000)

## 2019-06-18 RX ORDER — FLUTICASONE FUROATE AND VILANTEROL TRIFENATATE 100; 25 UG/1; UG/1
POWDER RESPIRATORY (INHALATION)
Qty: 60 EACH | Refills: 5 | Status: SHIPPED | OUTPATIENT
Start: 2019-06-18

## 2019-07-23 ENCOUNTER — NURSE TRIAGE (OUTPATIENT)
Dept: OTHER | Facility: CLINIC | Age: 54
End: 2019-07-23

## 2019-07-23 ENCOUNTER — TELEPHONE (OUTPATIENT)
Dept: FAMILY MEDICINE CLINIC | Age: 54
End: 2019-07-23

## 2019-07-23 ENCOUNTER — OFFICE VISIT (OUTPATIENT)
Dept: FAMILY MEDICINE CLINIC | Age: 54
End: 2019-07-23
Payer: COMMERCIAL

## 2019-07-23 ENCOUNTER — HOSPITAL ENCOUNTER (OUTPATIENT)
Dept: CT IMAGING | Age: 54
Discharge: HOME | End: 2019-07-23
Payer: COMMERCIAL

## 2019-07-23 VITALS
WEIGHT: 219.2 LBS | HEART RATE: 68 BPM | BODY MASS INDEX: 29.05 KG/M2 | OXYGEN SATURATION: 97 % | SYSTOLIC BLOOD PRESSURE: 132 MMHG | HEIGHT: 73 IN | DIASTOLIC BLOOD PRESSURE: 80 MMHG

## 2019-07-23 DIAGNOSIS — Z80.0 FAMILY HISTORY OF COLON CANCER: ICD-10-CM

## 2019-07-23 DIAGNOSIS — K92.1 BLOODY STOOLS: ICD-10-CM

## 2019-07-23 DIAGNOSIS — R10.32 LLQ ABDOMINAL PAIN: Primary | ICD-10-CM

## 2019-07-23 DIAGNOSIS — Z86.010 HISTORY OF COLON POLYPS: ICD-10-CM

## 2019-07-23 DIAGNOSIS — R10.32 LLQ ABDOMINAL PAIN: ICD-10-CM

## 2019-07-23 DIAGNOSIS — K60.2 ANAL FISSURE: ICD-10-CM

## 2019-07-23 LAB
A/G RATIO: 2.2 (ref 1.1–2.2)
ALBUMIN SERPL-MCNC: 4.7 G/DL (ref 3.4–5)
ALP BLD-CCNC: 52 U/L (ref 40–129)
ALT SERPL-CCNC: 17 U/L (ref 10–40)
ANION GAP SERPL CALCULATED.3IONS-SCNC: 13 MMOL/L (ref 3–16)
AST SERPL-CCNC: 13 U/L (ref 15–37)
BILIRUB SERPL-MCNC: 0.6 MG/DL (ref 0–1)
BUN BLDV-MCNC: 14 MG/DL (ref 7–20)
CALCIUM SERPL-MCNC: 9.7 MG/DL (ref 8.3–10.6)
CHLORIDE BLD-SCNC: 99 MMOL/L (ref 99–110)
CO2: 27 MMOL/L (ref 21–32)
CREAT SERPL-MCNC: 1.1 MG/DL (ref 0.9–1.3)
GFR AFRICAN AMERICAN: >60
GFR NON-AFRICAN AMERICAN: >60
GLOBULIN: 2.1 G/DL
GLUCOSE BLD-MCNC: 100 MG/DL (ref 70–99)
HCT VFR BLD CALC: 43.8 % (ref 40.5–52.5)
HEMOGLOBIN: 14.5 G/DL (ref 13.5–17.5)
MCH RBC QN AUTO: 31.3 PG (ref 26–34)
MCHC RBC AUTO-ENTMCNC: 33.2 G/DL (ref 31–36)
MCV RBC AUTO: 94.2 FL (ref 80–100)
PDW BLD-RTO: 14.7 % (ref 12.4–15.4)
PLATELET # BLD: 254 K/UL (ref 135–450)
PMV BLD AUTO: 8.3 FL (ref 5–10.5)
POTASSIUM SERPL-SCNC: 4.3 MMOL/L (ref 3.5–5.1)
RBC # BLD: 4.65 M/UL (ref 4.2–5.9)
SODIUM BLD-SCNC: 139 MMOL/L (ref 136–145)
TOTAL PROTEIN: 6.8 G/DL (ref 6.4–8.2)
WBC # BLD: 11.7 K/UL (ref 4–11)

## 2019-07-23 PROCEDURE — G8427 DOCREV CUR MEDS BY ELIG CLIN: HCPCS | Performed by: NURSE PRACTITIONER

## 2019-07-23 PROCEDURE — 36415 COLL VENOUS BLD VENIPUNCTURE: CPT | Performed by: NURSE PRACTITIONER

## 2019-07-23 PROCEDURE — 6360000004 HC RX CONTRAST MEDICATION: Performed by: FAMILY MEDICINE

## 2019-07-23 PROCEDURE — 1036F TOBACCO NON-USER: CPT | Performed by: NURSE PRACTITIONER

## 2019-07-23 PROCEDURE — G8417 CALC BMI ABV UP PARAM F/U: HCPCS | Performed by: NURSE PRACTITIONER

## 2019-07-23 PROCEDURE — 3017F COLORECTAL CA SCREEN DOC REV: CPT | Performed by: NURSE PRACTITIONER

## 2019-07-23 PROCEDURE — 99214 OFFICE O/P EST MOD 30 MIN: CPT | Performed by: NURSE PRACTITIONER

## 2019-07-23 PROCEDURE — 74178 CT ABD&PLV WO CNTR FLWD CNTR: CPT

## 2019-07-23 RX ORDER — CIPROFLOXACIN 500 MG/1
500 TABLET, FILM COATED ORAL 2 TIMES DAILY
Qty: 20 TABLET | Refills: 0 | Status: SHIPPED | OUTPATIENT
Start: 2019-07-23 | End: 2019-07-30 | Stop reason: SDUPTHER

## 2019-07-23 RX ORDER — METRONIDAZOLE 500 MG/1
500 TABLET ORAL 3 TIMES DAILY
Qty: 30 TABLET | Refills: 0 | Status: SHIPPED | OUTPATIENT
Start: 2019-07-23 | End: 2019-08-02

## 2019-07-23 RX ADMIN — IOPAMIDOL 80 ML: 755 INJECTION, SOLUTION INTRAVENOUS at 19:23

## 2019-07-23 RX ADMIN — IOPAMIDOL 50 ML: 612 INJECTION, SOLUTION INTRAVENOUS at 19:23

## 2019-07-23 ASSESSMENT — ENCOUNTER SYMPTOMS
CONSTIPATION: 1
SHORTNESS OF BREATH: 1
BLOOD IN STOOL: 1
VOMITING: 0
DIARRHEA: 0
RECTAL PAIN: 0
ABDOMINAL PAIN: 1
NAUSEA: 0

## 2019-07-23 NOTE — TELEPHONE ENCOUNTER
Symptoms: possible Crohn's flare up  ,ABD pain  Throbbing pain  fever difficulty sleeping / last night fast and hard blood discharge diarrhea   Bright due to hemorrhoids frequent urination every 2 - 2 and 1/2 hrs       Appointment offered: yes no available ap pts today and PCP -OOO this week     Please call pt back today and advise   Also was warm transferred to Triage Nurse

## 2019-07-23 NOTE — TELEPHONE ENCOUNTER
Reason for Disposition   SEVERE rectal bleeding (large blood clots; on and off, or constant bleeding)    Protocols used: RECTAL BLEEDING-ADULT-[place]    Pt calls with c/o abdominal pain, blood in stool , fever , and insomnia . These symptoms started last night 30 minutes after a salmon dinner and wine. The abdominal pain is a 7.25/10 now, was 7.5-8/10 - it is throbbing and dull and he feels bloated. Pt also reports having several episodes of rectal bleeding, where the toilet bowl is bright red and frequent urination. Pt reports h/o chron's disease x12 years and hemorrhoids. Caller with symptoms as documented above. Caller informed of disposition, pt verbalizes understanding. Care advice as documented.

## 2019-07-30 RX ORDER — CIPROFLOXACIN 500 MG/1
500 TABLET, FILM COATED ORAL 2 TIMES DAILY
Qty: 2 TABLET | Refills: 0 | Status: SHIPPED | OUTPATIENT
Start: 2019-07-30 | End: 2019-07-31

## 2019-08-09 ENCOUNTER — OFFICE VISIT (OUTPATIENT)
Dept: FAMILY MEDICINE CLINIC | Age: 54
End: 2019-08-09
Payer: COMMERCIAL

## 2019-08-09 ENCOUNTER — TELEPHONE (OUTPATIENT)
Dept: FAMILY MEDICINE CLINIC | Age: 54
End: 2019-08-09

## 2019-08-09 VITALS
SYSTOLIC BLOOD PRESSURE: 118 MMHG | HEART RATE: 68 BPM | OXYGEN SATURATION: 96 % | DIASTOLIC BLOOD PRESSURE: 72 MMHG | HEIGHT: 73 IN | WEIGHT: 213 LBS | TEMPERATURE: 98.2 F | BODY MASS INDEX: 28.23 KG/M2

## 2019-08-09 DIAGNOSIS — R19.7 DIARRHEA, UNSPECIFIED TYPE: ICD-10-CM

## 2019-08-09 DIAGNOSIS — R31.29 OTHER MICROSCOPIC HEMATURIA: ICD-10-CM

## 2019-08-09 DIAGNOSIS — R10.30 LOWER ABDOMINAL PAIN: ICD-10-CM

## 2019-08-09 DIAGNOSIS — R68.83 CHILLS: Primary | ICD-10-CM

## 2019-08-09 DIAGNOSIS — K64.9 HEMORRHOIDS, UNSPECIFIED HEMORRHOID TYPE: ICD-10-CM

## 2019-08-09 LAB
BASOPHILS ABSOLUTE: 0 K/UL (ref 0–0.2)
BASOPHILS RELATIVE PERCENT: 0.3 %
BILIRUBIN URINE: NEGATIVE
BILIRUBIN, POC: NEGATIVE
BLOOD URINE, POC: ABNORMAL
BLOOD, URINE: ABNORMAL
CLARITY, POC: ABNORMAL
CLARITY: CLEAR
COLOR, POC: YELLOW
COLOR: YELLOW
EOSINOPHILS ABSOLUTE: 0 K/UL (ref 0–0.6)
EOSINOPHILS RELATIVE PERCENT: 0.2 %
EPITHELIAL CELLS, UA: 0 /HPF (ref 0–5)
GLUCOSE URINE, POC: NEGATIVE
GLUCOSE URINE: NEGATIVE MG/DL
HCT VFR BLD CALC: 46.6 % (ref 40.5–52.5)
HEMOGLOBIN: 15.8 G/DL (ref 13.5–17.5)
HYALINE CASTS: 0 /LPF (ref 0–8)
KETONES, POC: NEGATIVE
KETONES, URINE: NEGATIVE MG/DL
LEUKOCYTE EST, POC: NEGATIVE
LEUKOCYTE ESTERASE, URINE: NEGATIVE
LYMPHOCYTES ABSOLUTE: 1.2 K/UL (ref 1–5.1)
LYMPHOCYTES RELATIVE PERCENT: 11.6 %
MCH RBC QN AUTO: 31.6 PG (ref 26–34)
MCHC RBC AUTO-ENTMCNC: 34 G/DL (ref 31–36)
MCV RBC AUTO: 92.8 FL (ref 80–100)
MICROSCOPIC EXAMINATION: YES
MONOCYTES ABSOLUTE: 0.9 K/UL (ref 0–1.3)
MONOCYTES RELATIVE PERCENT: 8.7 %
NEUTROPHILS ABSOLUTE: 8.4 K/UL (ref 1.7–7.7)
NEUTROPHILS RELATIVE PERCENT: 79.2 %
NITRITE, POC: NEGATIVE
NITRITE, URINE: NEGATIVE
PDW BLD-RTO: 13.5 % (ref 12.4–15.4)
PH UA: 6 (ref 5–8)
PH, POC: 5.5
PLATELET # BLD: 271 K/UL (ref 135–450)
PMV BLD AUTO: 8.4 FL (ref 5–10.5)
PROTEIN UA: NEGATIVE MG/DL
PROTEIN, POC: NEGATIVE
RBC # BLD: 5.02 M/UL (ref 4.2–5.9)
RBC UA: 2 /HPF (ref 0–4)
SPECIFIC GRAVITY UA: 1.01 (ref 1–1.03)
SPECIFIC GRAVITY, POC: 1.02
UROBILINOGEN, POC: ABNORMAL
UROBILINOGEN, URINE: 0.2 E.U./DL
WBC # BLD: 10.6 K/UL (ref 4–11)
WBC UA: 0 /HPF (ref 0–5)

## 2019-08-09 PROCEDURE — 3017F COLORECTAL CA SCREEN DOC REV: CPT | Performed by: NURSE PRACTITIONER

## 2019-08-09 PROCEDURE — G8417 CALC BMI ABV UP PARAM F/U: HCPCS | Performed by: NURSE PRACTITIONER

## 2019-08-09 PROCEDURE — 36415 COLL VENOUS BLD VENIPUNCTURE: CPT | Performed by: NURSE PRACTITIONER

## 2019-08-09 PROCEDURE — 99214 OFFICE O/P EST MOD 30 MIN: CPT | Performed by: NURSE PRACTITIONER

## 2019-08-09 PROCEDURE — G8427 DOCREV CUR MEDS BY ELIG CLIN: HCPCS | Performed by: NURSE PRACTITIONER

## 2019-08-09 PROCEDURE — 1036F TOBACCO NON-USER: CPT | Performed by: NURSE PRACTITIONER

## 2019-08-09 PROCEDURE — 81002 URINALYSIS NONAUTO W/O SCOPE: CPT | Performed by: NURSE PRACTITIONER

## 2019-08-09 ASSESSMENT — ENCOUNTER SYMPTOMS
DIARRHEA: 1
NAUSEA: 0
VOMITING: 0
ABDOMINAL PAIN: 0
SHORTNESS OF BREATH: 0
BLOOD IN STOOL: 0

## 2019-08-10 LAB — URINE CULTURE, ROUTINE: NORMAL

## 2019-08-12 ENCOUNTER — TELEPHONE (OUTPATIENT)
Dept: FAMILY MEDICINE CLINIC | Age: 54
End: 2019-08-12

## 2019-08-12 NOTE — TELEPHONE ENCOUNTER
Could see a \"decent amount of bile\"   Loose stools, not diarrhea - advised to continue BRAT diet   Still having a lot fatigue     Wants to know if anything else can be called in so that he isn't sleeping all the time/to help him get a little better.  Or if he needs to wait until his appointment Thursday with GI?

## 2019-08-12 NOTE — TELEPHONE ENCOUNTER
Pt called stating that he was referred to a GI doctor by Sheba Rao, he couldn't get appt until Thursday. He's asking if he should stay on the [address] or does he need to come in to see Sheba Rao because the infection is still there,conditions only slightly improved. Please advise.

## 2019-08-13 ENCOUNTER — TELEPHONE (OUTPATIENT)
Dept: FAMILY MEDICINE CLINIC | Age: 54
End: 2019-08-13

## 2019-08-13 NOTE — TELEPHONE ENCOUNTER
Patient was advised of bland diet but states that this isn't working. Would like advise on what else he can do.

## 2019-08-15 ENCOUNTER — TELEPHONE (OUTPATIENT)
Dept: FAMILY MEDICINE CLINIC | Age: 54
End: 2019-08-15

## 2019-08-15 NOTE — TELEPHONE ENCOUNTER
I left message to call         Previous encounter note    I would still encourage a bland diet with probiotics. I'd avoid anything with butter or fats. We could do a cdiff for stool if he is still having diarrhea. Let me know if he would like me to order? Sorry, I thought at one time he had said Thursday.   Thanks

## 2019-08-16 ENCOUNTER — TELEPHONE (OUTPATIENT)
Dept: FAMILY MEDICINE CLINIC | Age: 54
End: 2019-08-16
Payer: COMMERCIAL

## 2019-08-16 DIAGNOSIS — R31.29 OTHER MICROSCOPIC HEMATURIA: Primary | ICD-10-CM

## 2019-08-16 LAB
BILIRUBIN URINE: NEGATIVE
BILIRUBIN, POC: NEGATIVE
BLOOD URINE, POC: ABNORMAL
BLOOD, URINE: ABNORMAL
CLARITY, POC: CLEAR
CLARITY: CLEAR
COLOR, POC: YELLOW
COLOR: YELLOW
EPITHELIAL CELLS, UA: 0 /HPF (ref 0–5)
GLUCOSE URINE, POC: NEGATIVE
GLUCOSE URINE: NEGATIVE MG/DL
HYALINE CASTS: 0 /LPF (ref 0–8)
KETONES, POC: NEGATIVE
KETONES, URINE: NEGATIVE MG/DL
LEUKOCYTE EST, POC: NEGATIVE
LEUKOCYTE ESTERASE, URINE: NEGATIVE
MICROSCOPIC EXAMINATION: YES
NITRITE, POC: NEGATIVE
NITRITE, URINE: NEGATIVE
PH UA: 6 (ref 5–8)
PH, POC: 5.5
PROTEIN UA: NEGATIVE MG/DL
PROTEIN, POC: NEGATIVE
RBC UA: 0 /HPF (ref 0–4)
SPECIFIC GRAVITY UA: 1.02 (ref 1–1.03)
SPECIFIC GRAVITY, POC: 1.02
UROBILINOGEN, POC: ABNORMAL
UROBILINOGEN, URINE: 0.2 E.U./DL
WBC UA: 0 /HPF (ref 0–5)

## 2019-08-16 PROCEDURE — 81002 URINALYSIS NONAUTO W/O SCOPE: CPT | Performed by: NURSE PRACTITIONER

## 2019-08-16 NOTE — TELEPHONE ENCOUNTER
Patient came into the office. Stated that he did not want to keep his appointment for today at 10 with Claudette Lemons. States that he is seeing GI for his gi issues. Wanted to leave a urine to check the blood in his urine. Urine results are in patient's chart. Please advise.

## 2019-08-19 LAB
ORGANISM: ABNORMAL
URINE CULTURE, ROUTINE: ABNORMAL

## 2019-08-19 RX ORDER — CIPROFLOXACIN 500 MG/1
500 TABLET, FILM COATED ORAL 2 TIMES DAILY
Qty: 14 TABLET | Refills: 0 | Status: SHIPPED | OUTPATIENT
Start: 2019-08-19 | End: 2019-08-26

## 2019-08-26 ENCOUNTER — TELEPHONE (OUTPATIENT)
Dept: FAMILY MEDICINE CLINIC | Age: 54
End: 2019-08-26

## 2019-08-27 DIAGNOSIS — N30.01 ACUTE CYSTITIS WITH HEMATURIA: ICD-10-CM

## 2019-08-27 DIAGNOSIS — N30.00 ACUTE CYSTITIS WITHOUT HEMATURIA: Primary | ICD-10-CM

## 2019-08-27 RX ORDER — NITROFURANTOIN 25; 75 MG/1; MG/1
100 CAPSULE ORAL 2 TIMES DAILY
Qty: 14 CAPSULE | Refills: 0 | Status: SHIPPED | OUTPATIENT
Start: 2019-08-27 | End: 2019-09-03

## 2019-11-06 ENCOUNTER — TELEPHONE (OUTPATIENT)
Dept: FAMILY MEDICINE CLINIC | Age: 54
End: 2019-11-06

## 2022-08-30 ENCOUNTER — TELEPHONE (OUTPATIENT)
Dept: FAMILY MEDICINE CLINIC | Age: 57
End: 2022-08-30

## 2022-08-30 NOTE — TELEPHONE ENCOUNTER
We have not received a formal signed medical release for the patient requesting records for us to have even sent to [facility]. I called the patient and informed this, he states he is an  and waves everything. I informed him I could send him a DON form on My Chart, sent link to patient to setup account. Once signed release is received we can send to [facility]. We are unable to send patient his medical records via email.

## 2022-08-30 NOTE — TELEPHONE ENCOUNTER
Patient is calling in and would like to know if we can get all of his medical records together and send them to him via email. He states that he has been going back and forth with [facility] and they are stating that they are sent but than his new provider never receives them. The patient would like to make sure that his sleep study results are going to be included in this as well. The patient is requesting that these records be emailed to [email or web address].  Please advise

## 2022-11-03 ENCOUNTER — APPOINTMENT (RX ONLY)
Dept: URBAN - METROPOLITAN AREA CLINIC 74 | Facility: CLINIC | Age: 57
Setting detail: DERMATOLOGY
End: 2022-11-03

## 2022-11-03 DIAGNOSIS — L81.4 OTHER MELANIN HYPERPIGMENTATION: ICD-10-CM

## 2022-11-03 DIAGNOSIS — D22 MELANOCYTIC NEVI: ICD-10-CM

## 2022-11-03 DIAGNOSIS — D485 NEOPLASM OF UNCERTAIN BEHAVIOR OF SKIN: ICD-10-CM

## 2022-11-03 PROBLEM — D22.39 MELANOCYTIC NEVI OF OTHER PARTS OF FACE: Status: ACTIVE | Noted: 2022-11-03

## 2022-11-03 PROBLEM — D48.5 NEOPLASM OF UNCERTAIN BEHAVIOR OF SKIN: Status: ACTIVE | Noted: 2022-11-03

## 2022-11-03 PROCEDURE — 99203 OFFICE O/P NEW LOW 30 MIN: CPT | Mod: 25

## 2022-11-03 PROCEDURE — 69100 BIOPSY OF EXTERNAL EAR: CPT

## 2022-11-03 PROCEDURE — ? FULL BODY SKIN EXAM - DECLINED

## 2022-11-03 PROCEDURE — ? COUNSELING

## 2022-11-03 PROCEDURE — ? BIOPSY BY SHAVE METHOD

## 2022-11-03 PROCEDURE — ? TREATMENT REGIMEN

## 2022-11-03 ASSESSMENT — LOCATION SIMPLE DESCRIPTION DERM
LOCATION SIMPLE: RIGHT EAR
LOCATION SIMPLE: RIGHT CHEEK
LOCATION SIMPLE: LEFT CHEEK

## 2022-11-03 ASSESSMENT — LOCATION DETAILED DESCRIPTION DERM
LOCATION DETAILED: RIGHT SUPERIOR HELIX
LOCATION DETAILED: RIGHT CENTRAL MALAR CHEEK
LOCATION DETAILED: LEFT LATERAL MALAR CHEEK
LOCATION DETAILED: RIGHT SUPERIOR PREAURICULAR CHEEK
LOCATION DETAILED: RIGHT CAVUM CONCHA

## 2022-11-03 ASSESSMENT — LOCATION ZONE DERM
LOCATION ZONE: EAR
LOCATION ZONE: FACE

## 2022-11-29 ENCOUNTER — APPOINTMENT (RX ONLY)
Dept: URBAN - METROPOLITAN AREA CLINIC 51 | Facility: CLINIC | Age: 57
Setting detail: DERMATOLOGY
End: 2022-11-29

## 2022-11-29 PROBLEM — C44.222 SQUAMOUS CELL CARCINOMA OF SKIN OF RIGHT EAR AND EXTERNAL AURICULAR CANAL: Status: ACTIVE | Noted: 2022-11-29

## 2022-11-29 PROCEDURE — ? MOHS SURGERY

## 2022-11-29 PROCEDURE — 17311 MOHS 1 STAGE H/N/HF/G: CPT

## 2022-11-29 NOTE — PROCEDURE: MOHS SURGERY
Never smoker Transposition Flap Text: The defect edges were debeveled with a #15 scalpel blade.  Given the location of the defect and the proximity to free margins a transposition flap was deemed most appropriate.  Using a sterile surgical marker, an appropriate transposition flap was drawn incorporating the defect.    The area thus outlined was incised deep to adipose tissue with a #15 scalpel blade.  The skin margins were undermined to an appropriate distance in all directions utilizing iris scissors.

## 2022-11-29 NOTE — PROCEDURE: MOHS SURGERY
Mohs Histo Method Verbiage: Each section was then color coded, mapped and processed in the Mohs lab using the Mohs protocol and submitted for frozen section. \\n\\Kassie histologic findings, including description and depth of tumor present at margins (if any) and presence or absence of perineural involvement, or scar, is noted on the Mohs map.

## 2022-12-06 ENCOUNTER — APPOINTMENT (RX ONLY)
Dept: URBAN - METROPOLITAN AREA CLINIC 71 | Facility: CLINIC | Age: 57
Setting detail: DERMATOLOGY
End: 2022-12-06

## 2022-12-06 DIAGNOSIS — L82.1 OTHER SEBORRHEIC KERATOSIS: ICD-10-CM

## 2022-12-06 DIAGNOSIS — D18.0 HEMANGIOMA: ICD-10-CM

## 2022-12-06 DIAGNOSIS — Z71.89 OTHER SPECIFIED COUNSELING: ICD-10-CM

## 2022-12-06 DIAGNOSIS — L81.4 OTHER MELANIN HYPERPIGMENTATION: ICD-10-CM

## 2022-12-06 DIAGNOSIS — D22 MELANOCYTIC NEVI: ICD-10-CM

## 2022-12-06 DIAGNOSIS — Z85.828 PERSONAL HISTORY OF OTHER MALIGNANT NEOPLASM OF SKIN: ICD-10-CM

## 2022-12-06 PROBLEM — D22.5 MELANOCYTIC NEVI OF TRUNK: Status: ACTIVE | Noted: 2022-12-06

## 2022-12-06 PROBLEM — D18.01 HEMANGIOMA OF SKIN AND SUBCUTANEOUS TISSUE: Status: ACTIVE | Noted: 2022-12-06

## 2022-12-06 PROCEDURE — ? SUNSCREEN RECOMMENDATIONS

## 2022-12-06 PROCEDURE — 99213 OFFICE O/P EST LOW 20 MIN: CPT

## 2022-12-06 PROCEDURE — ? COUNSELING

## 2022-12-06 PROCEDURE — ? FULL BODY SKIN EXAM

## 2022-12-06 ASSESSMENT — LOCATION ZONE DERM
LOCATION ZONE: TRUNK
LOCATION ZONE: EAR

## 2022-12-06 ASSESSMENT — LOCATION SIMPLE DESCRIPTION DERM
LOCATION SIMPLE: RIGHT UPPER BACK
LOCATION SIMPLE: LEFT LOWER BACK
LOCATION SIMPLE: CHEST
LOCATION SIMPLE: RIGHT EAR
LOCATION SIMPLE: ABDOMEN

## 2022-12-06 ASSESSMENT — LOCATION DETAILED DESCRIPTION DERM
LOCATION DETAILED: RIGHT MID-UPPER BACK
LOCATION DETAILED: LEFT SUPERIOR MEDIAL MIDBACK
LOCATION DETAILED: RIGHT CAVUM CONCHA
LOCATION DETAILED: RIGHT MEDIAL SUPERIOR CHEST
LOCATION DETAILED: EPIGASTRIC SKIN

## 2022-12-06 NOTE — PROCEDURE: MIPS QUALITY
Quality 226: Preventive Care And Screening: Tobacco Use: Screening And Cessation Intervention: Patient screened for tobacco use and is an ex/non-smoker
Quality 130: Documentation Of Current Medications In The Medical Record: Current Medications Documented
Quality 265: Biopsy Follow-Up: Biopsy results reviewed, communicated, tracked, and documented
Detail Level: Detailed
Quality 431: Preventive Care And Screening: Unhealthy Alcohol Use - Screening: Patient screened for unhealthy alcohol use using a single question and scores less than 2 times per year
Quality 431: Preventive Care And Screening: Unhealthy Alcohol Use - Screening: Patient not identified as an unhealthy alcohol user when screened for unhealthy alcohol use using a systematic screening method

## 2023-03-01 NOTE — PROCEDURE: MOHS SURGERY
How Many Mls Were Removed From The 80 Mg/Ml (5ml) Vial When Preparing The Injectable Solution?: 0 Bi-Rhombic Flap Text: The defect edges were debeveled with a #15 scalpel blade.  Given the location of the defect and the proximity to free margins a bi-rhombic flap was deemed most appropriate.  Using a sterile surgical marker, an appropriate rhombic flap was drawn incorporating the defect. The area thus outlined was incised deep to adipose tissue with a #15 scalpel blade.  The skin margins were undermined to an appropriate distance in all directions utilizing iris scissors.

## 2023-06-06 ENCOUNTER — APPOINTMENT (RX ONLY)
Dept: URBAN - METROPOLITAN AREA CLINIC 71 | Facility: CLINIC | Age: 58
Setting detail: DERMATOLOGY
End: 2023-06-06

## 2023-06-06 DIAGNOSIS — Z71.89 OTHER SPECIFIED COUNSELING: ICD-10-CM

## 2023-06-06 DIAGNOSIS — L82.1 OTHER SEBORRHEIC KERATOSIS: ICD-10-CM

## 2023-06-06 DIAGNOSIS — D22 MELANOCYTIC NEVI: ICD-10-CM

## 2023-06-06 DIAGNOSIS — L81.4 OTHER MELANIN HYPERPIGMENTATION: ICD-10-CM

## 2023-06-06 DIAGNOSIS — D18.0 HEMANGIOMA: ICD-10-CM

## 2023-06-06 DIAGNOSIS — D485 NEOPLASM OF UNCERTAIN BEHAVIOR OF SKIN: ICD-10-CM

## 2023-06-06 PROBLEM — D48.5 NEOPLASM OF UNCERTAIN BEHAVIOR OF SKIN: Status: ACTIVE | Noted: 2023-06-06

## 2023-06-06 PROBLEM — D18.01 HEMANGIOMA OF SKIN AND SUBCUTANEOUS TISSUE: Status: ACTIVE | Noted: 2023-06-06

## 2023-06-06 PROBLEM — D22.5 MELANOCYTIC NEVI OF TRUNK: Status: ACTIVE | Noted: 2023-06-06

## 2023-06-06 PROCEDURE — ? COUNSELING

## 2023-06-06 PROCEDURE — ? FULL BODY SKIN EXAM

## 2023-06-06 PROCEDURE — 99213 OFFICE O/P EST LOW 20 MIN: CPT | Mod: 25

## 2023-06-06 PROCEDURE — 11102 TANGNTL BX SKIN SINGLE LES: CPT

## 2023-06-06 PROCEDURE — ? SUNSCREEN RECOMMENDATIONS

## 2023-06-06 PROCEDURE — ? BIOPSY BY SHAVE METHOD

## 2023-06-06 ASSESSMENT — LOCATION SIMPLE DESCRIPTION DERM
LOCATION SIMPLE: RIGHT UPPER BACK
LOCATION SIMPLE: LEFT LOWER BACK
LOCATION SIMPLE: ABDOMEN
LOCATION SIMPLE: CHEST
LOCATION SIMPLE: RIGHT UPPER ARM

## 2023-06-06 ASSESSMENT — LOCATION ZONE DERM
LOCATION ZONE: TRUNK
LOCATION ZONE: ARM

## 2023-06-06 ASSESSMENT — LOCATION DETAILED DESCRIPTION DERM
LOCATION DETAILED: RIGHT MEDIAL SUPERIOR CHEST
LOCATION DETAILED: LEFT SUPERIOR MEDIAL MIDBACK
LOCATION DETAILED: RIGHT MID-UPPER BACK
LOCATION DETAILED: RIGHT ANTERIOR DISTAL UPPER ARM
LOCATION DETAILED: EPIGASTRIC SKIN

## 2023-07-16 NOTE — PROCEDURE: MOHS SURGERY
The patient is a 81y Male complaining of altered mental status. Mohs Method Verbiage: The marked and patient-approved site was excised using the standard Mohs technique and the specimen was harvested as a microscopic controlled layer.

## 2023-12-06 ENCOUNTER — APPOINTMENT (RX ONLY)
Dept: URBAN - METROPOLITAN AREA CLINIC 71 | Facility: CLINIC | Age: 58
Setting detail: DERMATOLOGY
End: 2023-12-06

## 2023-12-06 DIAGNOSIS — D22 MELANOCYTIC NEVI: ICD-10-CM

## 2023-12-06 DIAGNOSIS — D18.0 HEMANGIOMA: ICD-10-CM

## 2023-12-06 DIAGNOSIS — D485 NEOPLASM OF UNCERTAIN BEHAVIOR OF SKIN: ICD-10-CM

## 2023-12-06 DIAGNOSIS — Z71.89 OTHER SPECIFIED COUNSELING: ICD-10-CM

## 2023-12-06 DIAGNOSIS — L82.1 OTHER SEBORRHEIC KERATOSIS: ICD-10-CM

## 2023-12-06 DIAGNOSIS — L81.4 OTHER MELANIN HYPERPIGMENTATION: ICD-10-CM

## 2023-12-06 DIAGNOSIS — S90.1 CONTUSION OF TOE WITHOUT DAMAGE TO NAIL: ICD-10-CM

## 2023-12-06 DIAGNOSIS — Z85.828 PERSONAL HISTORY OF OTHER MALIGNANT NEOPLASM OF SKIN: ICD-10-CM

## 2023-12-06 PROBLEM — D18.01 HEMANGIOMA OF SKIN AND SUBCUTANEOUS TISSUE: Status: ACTIVE | Noted: 2023-12-06

## 2023-12-06 PROBLEM — D22.5 MELANOCYTIC NEVI OF TRUNK: Status: ACTIVE | Noted: 2023-12-06

## 2023-12-06 PROBLEM — D29.4 BENIGN NEOPLASM OF SCROTUM: Status: ACTIVE | Noted: 2023-12-06

## 2023-12-06 PROBLEM — D48.5 NEOPLASM OF UNCERTAIN BEHAVIOR OF SKIN: Status: ACTIVE | Noted: 2023-12-06

## 2023-12-06 PROBLEM — S90.111A CONTUSION OF RIGHT GREAT TOE WITHOUT DAMAGE TO NAIL, INITIAL ENCOUNTER: Status: ACTIVE | Noted: 2023-12-06

## 2023-12-06 PROCEDURE — ? FULL BODY SKIN EXAM

## 2023-12-06 PROCEDURE — ? SUNSCREEN RECOMMENDATIONS

## 2023-12-06 PROCEDURE — ? COUNSELING

## 2023-12-06 PROCEDURE — ? BIOPSY BY SHAVE METHOD

## 2023-12-06 PROCEDURE — 11102 TANGNTL BX SKIN SINGLE LES: CPT

## 2023-12-06 PROCEDURE — 99213 OFFICE O/P EST LOW 20 MIN: CPT | Mod: 25

## 2023-12-06 ASSESSMENT — LOCATION ZONE DERM
LOCATION ZONE: TRUNK
LOCATION ZONE: LEG
LOCATION ZONE: TOENAIL

## 2023-12-06 ASSESSMENT — LOCATION DETAILED DESCRIPTION DERM
LOCATION DETAILED: RIGHT GREAT TOENAIL
LOCATION DETAILED: RIGHT MID-UPPER BACK
LOCATION DETAILED: RIGHT MEDIAL SUPERIOR CHEST
LOCATION DETAILED: EPIGASTRIC SKIN
LOCATION DETAILED: RIGHT ANTERIOR PROXIMAL THIGH
LOCATION DETAILED: LEFT SUPERIOR MEDIAL MIDBACK

## 2023-12-06 ASSESSMENT — LOCATION SIMPLE DESCRIPTION DERM
LOCATION SIMPLE: LEFT LOWER BACK
LOCATION SIMPLE: RIGHT GREAT TOE
LOCATION SIMPLE: RIGHT UPPER BACK
LOCATION SIMPLE: ABDOMEN
LOCATION SIMPLE: CHEST
LOCATION SIMPLE: RIGHT THIGH

## 2024-06-20 ENCOUNTER — APPOINTMENT (RX ONLY)
Dept: URBAN - METROPOLITAN AREA CLINIC 71 | Facility: CLINIC | Age: 59
Setting detail: DERMATOLOGY
End: 2024-06-20

## 2024-06-20 DIAGNOSIS — Z85.828 PERSONAL HISTORY OF OTHER MALIGNANT NEOPLASM OF SKIN: ICD-10-CM

## 2024-06-20 DIAGNOSIS — L81.4 OTHER MELANIN HYPERPIGMENTATION: ICD-10-CM

## 2024-06-20 DIAGNOSIS — Z71.89 OTHER SPECIFIED COUNSELING: ICD-10-CM

## 2024-06-20 DIAGNOSIS — D18.0 HEMANGIOMA: ICD-10-CM

## 2024-06-20 DIAGNOSIS — D485 NEOPLASM OF UNCERTAIN BEHAVIOR OF SKIN: ICD-10-CM

## 2024-06-20 DIAGNOSIS — L82.1 OTHER SEBORRHEIC KERATOSIS: ICD-10-CM

## 2024-06-20 DIAGNOSIS — D22 MELANOCYTIC NEVI: ICD-10-CM

## 2024-06-20 DIAGNOSIS — L73.8 OTHER SPECIFIED FOLLICULAR DISORDERS: ICD-10-CM

## 2024-06-20 PROBLEM — D18.01 HEMANGIOMA OF SKIN AND SUBCUTANEOUS TISSUE: Status: ACTIVE | Noted: 2024-06-20

## 2024-06-20 PROBLEM — D22.5 MELANOCYTIC NEVI OF TRUNK: Status: ACTIVE | Noted: 2024-06-20

## 2024-06-20 PROBLEM — D48.5 NEOPLASM OF UNCERTAIN BEHAVIOR OF SKIN: Status: ACTIVE | Noted: 2024-06-20

## 2024-06-20 PROCEDURE — 11102 TANGNTL BX SKIN SINGLE LES: CPT

## 2024-06-20 PROCEDURE — ? FULL BODY SKIN EXAM

## 2024-06-20 PROCEDURE — ? BIOPSY BY SHAVE METHOD

## 2024-06-20 PROCEDURE — ? SUNSCREEN RECOMMENDATIONS

## 2024-06-20 PROCEDURE — 99213 OFFICE O/P EST LOW 20 MIN: CPT | Mod: 25

## 2024-06-20 PROCEDURE — ? COUNSELING

## 2024-06-20 ASSESSMENT — LOCATION ZONE DERM
LOCATION ZONE: EAR
LOCATION ZONE: TRUNK
LOCATION ZONE: FACE

## 2024-06-20 ASSESSMENT — LOCATION SIMPLE DESCRIPTION DERM
LOCATION SIMPLE: CHEST
LOCATION SIMPLE: RIGHT EAR
LOCATION SIMPLE: ABDOMEN
LOCATION SIMPLE: LEFT LOWER BACK
LOCATION SIMPLE: RIGHT UPPER BACK
LOCATION SIMPLE: LEFT CHEEK

## 2024-06-20 ASSESSMENT — LOCATION DETAILED DESCRIPTION DERM
LOCATION DETAILED: LEFT SUPERIOR MEDIAL MIDBACK
LOCATION DETAILED: RIGHT MID-UPPER BACK
LOCATION DETAILED: EPIGASTRIC SKIN
LOCATION DETAILED: LEFT CENTRAL MALAR CHEEK
LOCATION DETAILED: RIGHT MEDIAL SUPERIOR CHEST
LOCATION DETAILED: LEFT MEDIAL INFERIOR CHEST
LOCATION DETAILED: RIGHT CAVUM CONCHA

## 2024-09-24 ENCOUNTER — APPOINTMENT (RX ONLY)
Dept: URBAN - METROPOLITAN AREA CLINIC 71 | Facility: CLINIC | Age: 59
Setting detail: DERMATOLOGY
End: 2024-09-24

## 2024-09-24 DIAGNOSIS — Z85.828 PERSONAL HISTORY OF OTHER MALIGNANT NEOPLASM OF SKIN: ICD-10-CM

## 2024-09-24 DIAGNOSIS — L21.8 OTHER SEBORRHEIC DERMATITIS: ICD-10-CM

## 2024-09-24 PROCEDURE — ? PRESCRIPTION

## 2024-09-24 PROCEDURE — ? FULL BODY SKIN EXAM - DECLINED

## 2024-09-24 PROCEDURE — ? PRESCRIPTION MEDICATION MANAGEMENT

## 2024-09-24 PROCEDURE — ? COUNSELING

## 2024-09-24 PROCEDURE — 99213 OFFICE O/P EST LOW 20 MIN: CPT

## 2024-09-24 RX ORDER — KETOCONAZOLE 20 MG/G
CREAM TOPICAL BID
Qty: 60 | Refills: 6 | Status: ERX | COMMUNITY
Start: 2024-09-24

## 2024-09-24 RX ADMIN — KETOCONAZOLE: 20 CREAM TOPICAL at 00:00

## 2024-09-24 ASSESSMENT — LOCATION DETAILED DESCRIPTION DERM
LOCATION DETAILED: LEFT INFERIOR CRUS OF ANTIHELIX
LOCATION DETAILED: RIGHT INFERIOR CRUS OF ANTIHELIX

## 2024-09-24 ASSESSMENT — LOCATION SIMPLE DESCRIPTION DERM
LOCATION SIMPLE: LEFT EAR
LOCATION SIMPLE: RIGHT EAR

## 2024-09-24 ASSESSMENT — LOCATION ZONE DERM: LOCATION ZONE: EAR

## 2024-09-24 NOTE — PROCEDURE: PRESCRIPTION MEDICATION MANAGEMENT
Render In Strict Bullet Format?: No
Detail Level: Simple
Initiate Treatment: ketoconazole 2 % topical cream BID\\nQuantity: 60.0 g  Days Supply: 90\\nSig: AAA ears BID PRN